# Patient Record
Sex: FEMALE | Race: WHITE | NOT HISPANIC OR LATINO | URBAN - METROPOLITAN AREA
[De-identification: names, ages, dates, MRNs, and addresses within clinical notes are randomized per-mention and may not be internally consistent; named-entity substitution may affect disease eponyms.]

---

## 2023-12-12 ENCOUNTER — APPOINTMENT (OUTPATIENT)
Dept: CARDIOLOGY | Facility: HOSPITAL | Age: 63
End: 2023-12-12
Payer: COMMERCIAL

## 2023-12-12 ENCOUNTER — HOSPITAL ENCOUNTER (EMERGENCY)
Facility: HOSPITAL | Age: 63
Discharge: HOME | End: 2023-12-12
Attending: STUDENT IN AN ORGANIZED HEALTH CARE EDUCATION/TRAINING PROGRAM
Payer: COMMERCIAL

## 2023-12-12 ENCOUNTER — APPOINTMENT (OUTPATIENT)
Dept: RADIOLOGY | Facility: HOSPITAL | Age: 63
End: 2023-12-12
Payer: COMMERCIAL

## 2023-12-12 VITALS
WEIGHT: 150 LBS | OXYGEN SATURATION: 100 % | HEART RATE: 66 BPM | SYSTOLIC BLOOD PRESSURE: 145 MMHG | BODY MASS INDEX: 24.11 KG/M2 | RESPIRATION RATE: 21 BRPM | TEMPERATURE: 98.8 F | DIASTOLIC BLOOD PRESSURE: 81 MMHG | HEIGHT: 66 IN

## 2023-12-12 DIAGNOSIS — R07.9 CHEST PAIN, UNSPECIFIED TYPE: Primary | ICD-10-CM

## 2023-12-12 LAB
ALBUMIN SERPL BCP-MCNC: 4.3 G/DL (ref 3.4–5)
ALP SERPL-CCNC: 73 U/L (ref 33–136)
ALT SERPL W P-5'-P-CCNC: 16 U/L (ref 7–45)
ANION GAP SERPL CALC-SCNC: 13 MMOL/L (ref 10–20)
AST SERPL W P-5'-P-CCNC: 19 U/L (ref 9–39)
BASOPHILS # BLD AUTO: 0.02 X10*3/UL (ref 0–0.1)
BASOPHILS NFR BLD AUTO: 0.3 %
BILIRUB SERPL-MCNC: 0.6 MG/DL (ref 0–1.2)
BUN SERPL-MCNC: 7 MG/DL (ref 6–23)
CALCIUM SERPL-MCNC: 9.3 MG/DL (ref 8.6–10.3)
CARDIAC TROPONIN I PNL SERPL HS: 8 NG/L (ref 0–13)
CARDIAC TROPONIN I PNL SERPL HS: <3 NG/L (ref 0–13)
CHLORIDE SERPL-SCNC: 101 MMOL/L (ref 98–107)
CO2 SERPL-SCNC: 26 MMOL/L (ref 21–32)
CREAT SERPL-MCNC: 0.6 MG/DL (ref 0.5–1.05)
EOSINOPHIL # BLD AUTO: 0.04 X10*3/UL (ref 0–0.7)
EOSINOPHIL NFR BLD AUTO: 0.5 %
ERYTHROCYTE [DISTWIDTH] IN BLOOD BY AUTOMATED COUNT: 12.7 % (ref 11.5–14.5)
GFR SERPL CREATININE-BSD FRML MDRD: >90 ML/MIN/1.73M*2
GLUCOSE SERPL-MCNC: 89 MG/DL (ref 74–99)
HCT VFR BLD AUTO: 42.7 % (ref 36–46)
HGB BLD-MCNC: 14.1 G/DL (ref 12–16)
IMM GRANULOCYTES # BLD AUTO: 0.02 X10*3/UL (ref 0–0.7)
IMM GRANULOCYTES NFR BLD AUTO: 0.3 % (ref 0–0.9)
LYMPHOCYTES # BLD AUTO: 0.72 X10*3/UL (ref 1.2–4.8)
LYMPHOCYTES NFR BLD AUTO: 9.1 %
MAGNESIUM SERPL-MCNC: 2.06 MG/DL (ref 1.6–2.4)
MCH RBC QN AUTO: 32.2 PG (ref 26–34)
MCHC RBC AUTO-ENTMCNC: 33 G/DL (ref 32–36)
MCV RBC AUTO: 98 FL (ref 80–100)
MONOCYTES # BLD AUTO: 0.8 X10*3/UL (ref 0.1–1)
MONOCYTES NFR BLD AUTO: 10.1 %
NEUTROPHILS # BLD AUTO: 6.29 X10*3/UL (ref 1.2–7.7)
NEUTROPHILS NFR BLD AUTO: 79.7 %
NRBC BLD-RTO: 0 /100 WBCS (ref 0–0)
PLATELET # BLD AUTO: 271 X10*3/UL (ref 150–450)
POTASSIUM SERPL-SCNC: 4.9 MMOL/L (ref 3.5–5.3)
PROT SERPL-MCNC: 6.5 G/DL (ref 6.4–8.2)
RBC # BLD AUTO: 4.38 X10*6/UL (ref 4–5.2)
SODIUM SERPL-SCNC: 135 MMOL/L (ref 136–145)
WBC # BLD AUTO: 7.9 X10*3/UL (ref 4.4–11.3)

## 2023-12-12 PROCEDURE — 84484 ASSAY OF TROPONIN QUANT: CPT | Performed by: EMERGENCY MEDICINE

## 2023-12-12 PROCEDURE — 71046 X-RAY EXAM CHEST 2 VIEWS: CPT | Mod: FOREIGN READ | Performed by: RADIOLOGY

## 2023-12-12 PROCEDURE — 93005 ELECTROCARDIOGRAM TRACING: CPT

## 2023-12-12 PROCEDURE — 2500000001 HC RX 250 WO HCPCS SELF ADMINISTERED DRUGS (ALT 637 FOR MEDICARE OP): Performed by: EMERGENCY MEDICINE

## 2023-12-12 PROCEDURE — 36415 COLL VENOUS BLD VENIPUNCTURE: CPT | Performed by: EMERGENCY MEDICINE

## 2023-12-12 PROCEDURE — 83735 ASSAY OF MAGNESIUM: CPT | Performed by: EMERGENCY MEDICINE

## 2023-12-12 PROCEDURE — 99283 EMERGENCY DEPT VISIT LOW MDM: CPT | Mod: 25

## 2023-12-12 PROCEDURE — 80053 COMPREHEN METABOLIC PANEL: CPT | Performed by: EMERGENCY MEDICINE

## 2023-12-12 PROCEDURE — 85025 COMPLETE CBC W/AUTO DIFF WBC: CPT | Performed by: EMERGENCY MEDICINE

## 2023-12-12 PROCEDURE — 99284 EMERGENCY DEPT VISIT MOD MDM: CPT | Performed by: STUDENT IN AN ORGANIZED HEALTH CARE EDUCATION/TRAINING PROGRAM

## 2023-12-12 PROCEDURE — 71046 X-RAY EXAM CHEST 2 VIEWS: CPT | Mod: FR

## 2023-12-12 RX ORDER — LOPERAMIDE HYDROCHLORIDE 2 MG/1
2 CAPSULE ORAL DAILY
COMMUNITY
Start: 2023-10-16

## 2023-12-12 RX ORDER — ACETAMINOPHEN 500 MG
4000 TABLET ORAL DAILY
COMMUNITY

## 2023-12-12 RX ORDER — NAPROXEN SODIUM 220 MG/1
324 TABLET, FILM COATED ORAL ONCE
Status: COMPLETED | OUTPATIENT
Start: 2023-12-12 | End: 2023-12-12

## 2023-12-12 RX ORDER — DULOXETIN HYDROCHLORIDE 30 MG/1
1 CAPSULE, DELAYED RELEASE ORAL DAILY
COMMUNITY
Start: 2023-08-04 | End: 2024-05-06 | Stop reason: ALTCHOICE

## 2023-12-12 RX ORDER — METOPROLOL TARTRATE 25 MG/1
25 TABLET, FILM COATED ORAL DAILY
COMMUNITY
Start: 2022-06-07 | End: 2024-01-19 | Stop reason: SDUPTHER

## 2023-12-12 RX ADMIN — ASPIRIN 81 MG CHEWABLE TABLET 324 MG: 81 TABLET CHEWABLE at 11:22

## 2023-12-12 ASSESSMENT — COLUMBIA-SUICIDE SEVERITY RATING SCALE - C-SSRS
2. HAVE YOU ACTUALLY HAD ANY THOUGHTS OF KILLING YOURSELF?: NO
6. HAVE YOU EVER DONE ANYTHING, STARTED TO DO ANYTHING, OR PREPARED TO DO ANYTHING TO END YOUR LIFE?: NO
1. IN THE PAST MONTH, HAVE YOU WISHED YOU WERE DEAD OR WISHED YOU COULD GO TO SLEEP AND NOT WAKE UP?: NO

## 2023-12-12 ASSESSMENT — PAIN - FUNCTIONAL ASSESSMENT: PAIN_FUNCTIONAL_ASSESSMENT: 0-10

## 2023-12-12 ASSESSMENT — PAIN SCALES - GENERAL: PAINLEVEL_OUTOF10: 0 - NO PAIN

## 2023-12-12 NOTE — PROGRESS NOTES
Pharmacy Medication History Review    Vida Garcia is a 63 y.o. female admitted for No Principal Problem: There is no principal problem currently on the Problem List. Please update the Problem List and refresh.. Pharmacy reviewed the patient's [id number] medications and allergies for accuracy.    The list below reflectives the updated PTA list. Please review each medication in order reconciliation for additional clarification and justification.  (Not in a hospital admission)       The list below reflectives the updated allergy list. Please review each documented allergy for additional clarification and justification.  Allergies  Reviewed by Bruce Bourne MD on 12/12/2023        Severity Reactions Comments    Diphenoxylate-atropine Low Nausea/vomiting     Meperidine Low Nausea/vomiting     Metronidazole Low Nausea/vomiting     Sulfa (sulfonamide Antibiotics) Low Swelling             Below are additional concerns with the patient's PTA list.    See PTA med list    Linda Van CPhT

## 2023-12-12 NOTE — ED TRIAGE NOTES
PT. STATES GOT CHEST PAIN AFTER WAKING AROUND 0810 THIS MORNING, STATES PAIN RADIATED ACROSS CHEST. PT. STATES PAIN ONLY LASTED ABOUT 10 SECONDS, DENIES PAIN IN TRIAGE. PT. STATES FELT FLUSHED RIGHT AFTER, DENIES OTHER ASSOCIATED SYMPTOMS. PT. STATES ONLY HAS ONE LUNG S/P LUNG CA.

## 2023-12-12 NOTE — ED PROVIDER NOTES
"HPI   Chief Complaint   Patient presents with    Chest Pain     PT. STATES GOT CHEST PAIN AFTER WAKING AROUND 0810 THIS MORNING, STATES PAIN RADIATED ACROSS CHEST. PT. STATES PAIN ONLY LASTED ABOUT 10 SECONDS, DENIES PAIN IN TRIAGE. PT. STATES FELT FLUSHED RIGHT AFTER, DENIES OTHER ASSOCIATED SYMPTOMS. PT. STATES ONLY HAS ONE LUNG S/P LUNG CA.       Patient is a pleasant 63-year-old female, medical history significant for prior lung cancer status post resection, history of SVT, the presents to the emergency department chest pain.  Patient states that last night, she bent her head down and felt some tightness in her upper chest.  She states it was fleeting.  Today, she was getting out of bed and had a sharp pain in the substernal area that lasted about 7 to 10 seconds.  She states that she did get a little bit sweaty afterwards.  Pain has not recurred.  She states \"today, she has been in her normal state of health.  She denies fever.  She denies chills or sweats.  She denies any other systemic complaints.      History provided by:  Patient                      Katharine Coma Scale Score: 15                  Patient History   Past Medical History:   Diagnosis Date    Cancer (CMS/HCC)     SVT (supraventricular tachycardia)      Past Surgical History:   Procedure Laterality Date    LUNG REMOVAL, TOTAL      OTHER SURGICAL HISTORY  01/03/2022    Pneumonectomy     No family history on file.  Social History     Tobacco Use    Smoking status: Former     Types: Cigarettes    Smokeless tobacco: Never   Vaping Use    Vaping Use: Never used   Substance Use Topics    Alcohol use: Yes     Comment: 2 BEERS DAILY    Drug use: Never       Physical Exam   ED Triage Vitals [12/12/23 0923]   Temp Heart Rate Resp BP   37.1 °C (98.8 °F) 73 16 128/73      SpO2 Temp Source Heart Rate Source Patient Position   97 % Temporal Monitor Sitting      BP Location FiO2 (%)     Right arm --       Physical Exam  Vitals and nursing note reviewed. "   Constitutional:       General: She is not in acute distress.     Appearance: She is well-developed.   HENT:      Head: Normocephalic and atraumatic.   Eyes:      Conjunctiva/sclera: Conjunctivae normal.   Neck:      Thyroid: No thyromegaly.   Cardiovascular:      Rate and Rhythm: Normal rate and regular rhythm.      Heart sounds: No murmur heard.  Pulmonary:      Effort: Pulmonary effort is normal. No respiratory distress.      Breath sounds: Normal breath sounds.   Abdominal:      Palpations: Abdomen is soft.      Tenderness: There is no abdominal tenderness.   Musculoskeletal:         General: No swelling.      Cervical back: Normal range of motion and neck supple.   Skin:     General: Skin is warm and dry.      Capillary Refill: Capillary refill takes less than 2 seconds.   Neurological:      General: No focal deficit present.      Mental Status: She is alert and oriented to person, place, and time.   Psychiatric:         Mood and Affect: Mood normal.         ED Course & [facility]   ED Course as of 12/12/23 1329   Tue Dec 12, 2023   1122 EKG interpreted by me.  Sinus rhythm.  Rate of 79.  Flattening the T waves laterally.  No acute ischemia.  No STEMI.  No significant change from prior. [MK]   1123 Chest x-ray demonstrates evidence of prior pneumonectomy, no infiltrate, no effusion. [MK]   1123 CBC unremarkable. [MK]   1207 Metabolic panel unremarkable.  Initial cardiac enzymes normal. [MK]   1322 Repeat troponin normal. [MK]      ED Course User Index  [MK] Giovanni Patel MD         Diagnoses as of 12/12/23 1329   Chest pain, unspecified type       Medical Decision Making  Medical Decision Making: Patient presents to the emergency department transient chest pain that is since resolved.  On arrival, she had no further symptoms.  Metabolic workup was pursued.  EKG does not show evidence of acute ischemia.  First cardiac enzymes are normal.  Metabolic workup also unremarkable.  Patient reevaluated has remained  pain-free.  Repeat cardiac enzymes were also unremarkable.  I did have discussion with patient.  Based on her heart score of 3, I do feel that she is safe for outpatient therapy.  The patient is agreeable.    EKG interpreted by myself (ED attending physician): Reviewed    Differential Diagnoses Considered: Acute coronary syndrome, pneumonia, musculoskeletal pain    Chronic Medical Conditions Significantly Affecting Care: History of prior lung cancer, history of SVT    External Records Reviewed: I reviewed recent and relevant outside records including: Outpatient medication reconciliation    Independent Interpretation of Studies:  I independently interpreted: Chest x-ray obtained and reviewed    Escalation of Care:  Appropriate for outpatient management    Social Determinants of Health Significantly Affecting Care:  No social determinants    Prescription Drug Consideration: Aspirin    Diagnostic testing considered: Noncontributory          Procedure  Procedures     Giovanni Patel MD  12/12/23 [id number]

## 2023-12-14 LAB
ATRIAL RATE: 79 BPM
P AXIS: 95 DEGREES
PR INTERVAL: 159 MS
Q ONSET: 251 MS
QRS COUNT: 13 BEATS
QRS DURATION: 92 MS
QT INTERVAL: 355 MS
QTC CALCULATION(BAZETT): 407 MS
QTC FREDERICIA: 389 MS
R AXIS: 48 DEGREES
T AXIS: -44 DEGREES
T OFFSET: 428 MS
VENTRICULAR RATE: 79 BPM

## 2024-01-19 DIAGNOSIS — I10 HYPERTENSION, UNSPECIFIED TYPE: ICD-10-CM

## 2024-01-22 RX ORDER — METOPROLOL TARTRATE 25 MG/1
25 TABLET, FILM COATED ORAL DAILY
Qty: 180 TABLET | Refills: 1 | Status: SHIPPED | OUTPATIENT
Start: 2024-01-22

## 2024-04-04 ENCOUNTER — HOSPITAL ENCOUNTER (OUTPATIENT)
Dept: VASCULAR MEDICINE | Facility: CLINIC | Age: 64
Discharge: HOME | End: 2024-04-04
Payer: COMMERCIAL

## 2024-04-04 DIAGNOSIS — M79.601 PAIN IN RIGHT ARM: ICD-10-CM

## 2024-04-04 PROCEDURE — 93971 EXTREMITY STUDY: CPT

## 2024-04-04 PROCEDURE — 93971 EXTREMITY STUDY: CPT | Performed by: INTERNAL MEDICINE

## 2024-04-18 PROBLEM — Z86.19 HISTORY OF SHINGLES: Status: ACTIVE | Noted: 2024-04-01

## 2024-04-18 PROBLEM — I47.10 PAROXYSMAL SVT (SUPRAVENTRICULAR TACHYCARDIA) (CMS-HCC): Status: ACTIVE | Noted: 2022-05-27

## 2024-04-18 PROBLEM — K50.80 REGIONAL ENTERITIS OF SMALL INTESTINE WITH LARGE INTESTINE (MULTI): Status: ACTIVE | Noted: 2024-04-18

## 2024-04-18 PROBLEM — E78.5 HYPERLIPIDEMIA: Status: ACTIVE | Noted: 2024-04-01

## 2024-04-18 PROBLEM — F41.1 GAD (GENERALIZED ANXIETY DISORDER): Status: ACTIVE | Noted: 2022-08-25

## 2024-04-18 PROBLEM — E55.9 VITAMIN D DEFICIENCY: Status: ACTIVE | Noted: 2022-03-30

## 2024-05-06 ENCOUNTER — OFFICE VISIT (OUTPATIENT)
Dept: CARDIOLOGY | Facility: CLINIC | Age: 64
End: 2024-05-06
Payer: COMMERCIAL

## 2024-05-06 VITALS
OXYGEN SATURATION: 96 % | SYSTOLIC BLOOD PRESSURE: 118 MMHG | WEIGHT: 157 LBS | DIASTOLIC BLOOD PRESSURE: 74 MMHG | BODY MASS INDEX: 25.23 KG/M2 | HEIGHT: 66 IN | HEART RATE: 76 BPM

## 2024-05-06 DIAGNOSIS — E78.2 MIXED HYPERLIPIDEMIA: Primary | ICD-10-CM

## 2024-05-06 DIAGNOSIS — I47.10 PAROXYSMAL SVT (SUPRAVENTRICULAR TACHYCARDIA) (CMS-HCC): ICD-10-CM

## 2024-05-06 PROCEDURE — 99213 OFFICE O/P EST LOW 20 MIN: CPT | Performed by: INTERNAL MEDICINE

## 2024-05-06 PROCEDURE — 1036F TOBACCO NON-USER: CPT | Performed by: INTERNAL MEDICINE

## 2024-05-06 RX ORDER — PRAVASTATIN SODIUM 40 MG/1
40 TABLET ORAL
COMMUNITY
Start: 2024-04-01 | End: 2025-04-01

## 2024-05-06 NOTE — ASSESSMENT & PLAN NOTE
1.  PSVT: This patient has a history of SVT up to 210 bpm which was first identified in December 2021.  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  She has had no documented recurrences since then.  The patient will use the metoprolol as needed for palpitations.  No further cardiac testing is recommended at this time.  Follow-up in 12 to 18 months.

## 2024-05-06 NOTE — PROGRESS NOTES
"History Of Present Illness:      This is a 63-year-old female with a history of supraventricular tachycardia.  She had a recent emergency department visit because of atypical chest pain.  She was recently placed on pravastatin for treatment of hyperlipidemia as the most recent LDL was 161 mg/dL.    Past medical history:    Lung cancer with history of left pneumonectomy  COPD  Crohn's disease with history of a colectomy  PSVT: History of catheter ablation May 2022 for typical AVNRT    Review of Systems  Other review of systems negative     Last Recorded Vitals:      5/27/2023     9:39 AM 5/27/2023     1:03 PM 6/21/2023    11:35 AM 12/12/2023     9:23 AM 12/12/2023    11:30 AM 12/12/2023     1:30 PM 5/6/2024    11:16 AM   Vitals   Systolic 138  110 128 141 145 118   Diastolic 77  68 73 72 81 74   Heart Rate 77  74 73 67 66 76   Temp 36.4 °C (97.5 °F)   37.1 °C (98.8 °F)      Resp 16   16 21 21    Height (in) 1.676 m (5' 5.98\") 1.701 m (5' 6.97\") 1.702 m (5' 7\") 1.676 m (5' 6\")   1.676 m (5' 6\")   Weight (lb) 143.3 143.3 149 150   157   BMI 23.14 kg/m2 22.46 kg/m2 23.34 kg/m2 24.21 kg/m2   25.34 kg/m2   BSA (m2) 1.74 m2 1.75 m2 1.79 m2 1.78 m2   1.82 m2   Visit Report       Report          Allergies:  Diphenoxylate-atropine, Meperidine, Metronidazole, and Sulfa (sulfonamide antibiotics)    Outpatient Medications:  Current Outpatient Medications   Medication Instructions    cholecalciferol (VITAMIN D-3) 4,000 Units, oral, Daily    loperamide (IMODIUM) 2 mg, oral, Daily    metoprolol tartrate (LOPRESSOR) 25 mg, oral, Daily    pravastatin (PRAVACHOL) 40 mg, oral, Daily RT       Physical Exam:    General Appearance:  Alert, oriented, no distress  Skin:  Warm and dry  Head and Neck:  No elevation of JVP, no carotid bruits  Cardiac Exam:  Rhythm is regular, S1 and S2 are normal, grade 1/6 systolic murmur at the right upper sternal border0  Lungs:  Clear to auscultation  Extremities:  no edema  Neurologic:  No focal " deficits  Psychiatric:  Appropriate mood and behavior         Cardiology Tests:  I have personally review the diagnostic cardiac testing and my interpretation is as follows:    Cardiac event monitor January 2022: Normal sinus rhythm with an average heart rate of 72 bpm.  No episodes of SVT.          Assessment/Plan   Problem List Items Addressed This Visit             ICD-10-CM    Hyperlipidemia - Primary E78.5    Relevant Orders    CT cardiac scoring wo IV contrast    Paroxysmal SVT (supraventricular tachycardia) (CMS-HCC) I47.10     1.  PSVT: This patient has a history of SVT up to 210 bpm which was first identified in December 2021.  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  She has had no documented recurrences since then.  The patient will use the metoprolol as needed for palpitations.  No further cardiac testing is recommended at this time.  Follow-up in 12 to 18 months.            James C Ramicone, DO

## 2024-06-27 ENCOUNTER — HOSPITAL ENCOUNTER (OUTPATIENT)
Dept: RADIOLOGY | Facility: CLINIC | Age: 64
Discharge: HOME | End: 2024-06-27
Payer: MEDICAID

## 2024-06-27 DIAGNOSIS — E78.2 MIXED HYPERLIPIDEMIA: ICD-10-CM

## 2024-06-27 PROCEDURE — 75571 CT HRT W/O DYE W/CA TEST: CPT

## 2024-08-06 ENCOUNTER — APPOINTMENT (OUTPATIENT)
Dept: UROLOGY | Facility: CLINIC | Age: 64
End: 2024-08-06
Payer: MEDICARE

## 2024-08-06 VITALS
HEIGHT: 66 IN | SYSTOLIC BLOOD PRESSURE: 120 MMHG | TEMPERATURE: 96.7 F | HEART RATE: 77 BPM | BODY MASS INDEX: 24.11 KG/M2 | DIASTOLIC BLOOD PRESSURE: 80 MMHG | WEIGHT: 150 LBS

## 2024-08-06 DIAGNOSIS — R39.198 DIFFICULTY VOIDING: Primary | ICD-10-CM

## 2024-08-06 DIAGNOSIS — R10.9 FLANK PAIN: ICD-10-CM

## 2024-08-06 DIAGNOSIS — R82.81 PYURIA: ICD-10-CM

## 2024-08-06 PROBLEM — K50.80 REGIONAL ENTERITIS OF SMALL INTESTINE WITH LARGE INTESTINE (MULTI): Status: RESOLVED | Noted: 2024-04-18 | Resolved: 2024-08-06

## 2024-08-06 PROBLEM — E78.5 HYPERLIPIDEMIA: Status: RESOLVED | Noted: 2024-04-01 | Resolved: 2024-08-06

## 2024-08-06 PROBLEM — K50.80 CROHN'S DISEASE OF BOTH SMALL AND LARGE INTESTINE (MULTI): Status: RESOLVED | Noted: 2019-09-30 | Resolved: 2024-08-06

## 2024-08-06 PROBLEM — K50.80 CROHN'S DISEASE OF BOTH SMALL AND LARGE INTESTINE (MULTI): Status: ACTIVE | Noted: 2019-09-30

## 2024-08-06 LAB
POC APPEARANCE, URINE: CLEAR
POC BILIRUBIN, URINE: NEGATIVE
POC BLOOD, URINE: NEGATIVE
POC COLOR, URINE: YELLOW
POC GLUCOSE, URINE: NEGATIVE MG/DL
POC KETONES, URINE: NEGATIVE MG/DL
POC LEUKOCYTES, URINE: ABNORMAL
POC NITRITE,URINE: NEGATIVE
POC PH, URINE: 6.5 PH
POC PROTEIN, URINE: NEGATIVE MG/DL
POC SPECIFIC GRAVITY, URINE: 1.01
POC UROBILINOGEN, URINE: 0.2 EU/DL

## 2024-08-06 PROCEDURE — 99204 OFFICE O/P NEW MOD 45 MIN: CPT | Performed by: NURSE PRACTITIONER

## 2024-08-06 PROCEDURE — 81003 URINALYSIS AUTO W/O SCOPE: CPT | Performed by: NURSE PRACTITIONER

## 2024-08-06 PROCEDURE — 3008F BODY MASS INDEX DOCD: CPT | Performed by: NURSE PRACTITIONER

## 2024-08-06 PROCEDURE — 1036F TOBACCO NON-USER: CPT | Performed by: NURSE PRACTITIONER

## 2024-08-06 RX ORDER — DULOXETIN HYDROCHLORIDE 30 MG/1
CAPSULE, DELAYED RELEASE ORAL
COMMUNITY
Start: 2023-08-04

## 2024-08-06 NOTE — PROGRESS NOTES
UROLOGIC INITIAL EVALUATION     PROBLEM LIST:  1. Difficulty voiding  CT abdomen pelvis wo IV contrast    Urine Culture      2. Flank pain  POCT UA Automated manually resulted      3. Pyuria  Urine Culture    Microscopic Only, Urine           HISTORY OF PRESENT ILLNESS:   Vida Garcia is a 63 y.o. with lung, cancer BAILEY  Presents today with concern for possible kidney stone, UTI  Seen unaccompanied  Reports significant B mid-back pain 3 weeks ago  Afterwards had to strain to void  Thought she might have kidney stone  Went to urgent care, prescribed nitrofurantoin, oxybutynin & tamsulosin  Notes stomach aches, explosive diarrhea since starting abx  Hx bowel resection; last normal BM 2 weeks ago  Normally ranges from firm to loose   Was feeling better then had nausea yesterday  Finished nitrofurantoin & stopped tamsulosin Sunday   No hx of recurrent UTI or nephrolithiasis  No hematuria or dysuria    PAST MEDICAL HISTORY:  Past Medical History:   Diagnosis Date    Cancer (Multi)     Squamous cell carcinoma of lung (Multi) 06/17/2010    Note: 10/1/2010 Assessment: The patient is a 49-year-old woman with a left hilar  mass.  She has been treated with induction chemoradiation therapy  with good response.  Her metastatic survey is negative. S/p bronchoscopy, left anterior thoracotomy, left intrapericaridal pneumonectomy, radical lymphadenectomy. Transferred to [facility] on 9/29/2010.      SVT (supraventricular tachycardia) (CMS-HCC)        PAST SURGICAL HISTORY:  Past Surgical History:   Procedure Laterality Date    LUNG REMOVAL, TOTAL      OTHER SURGICAL HISTORY  01/03/2022    Pneumonectomy        ALLERGIES:   Allergies   Allergen Reactions    Diphenoxylate-Atropine Nausea/vomiting    Meperidine Nausea/vomiting    Metronidazole Nausea/vomiting    Sulfa (Sulfonamide Antibiotics) Swelling        MEDICATIONS:   Current Outpatient Medications on File Prior to Visit   Medication Sig Dispense Refill    DULoxetine (Cymbalta) 30 mg   capsule Take by mouth.      metoprolol tartrate (Lopressor) 25 mg tablet Take 1 tablet (25 mg) by mouth once daily. 180 tablet 1    pravastatin (Pravachol) 40 mg tablet Take 1 tablet (40 mg) by mouth once daily.      cholecalciferol (Vitamin D-3) 50 mcg (2,000 unit) capsule Take 2 capsules (100 mcg) by mouth early in the morning..      loperamide (Imodium) 2 mg capsule Take 1 capsule (2 mg) by mouth once daily.       No current facility-administered medications on file prior to visit.        SOCIAL HISTORY:  Patient  reports that she has quit smoking. Her smoking use included cigarettes. She has never used smokeless tobacco. She reports current alcohol use. She reports that she does not use drugs.   Social History     Socioeconomic History    Marital status: Single     Spouse name: Not on file    Number of children: Not on file    Years of education: Not on file    Highest education level: Not on file   Occupational History    Not on file   Tobacco Use    Smoking status: Former     Types: Cigarettes    Smokeless tobacco: Never   Vaping Use    Vaping status: Never Used   Substance and Sexual Activity    Alcohol use: Yes     Comment: 2 BEERS DAILY    Drug use: Never    Sexual activity: Not on file   Other Topics Concern    Not on file   Social History Narrative    Not on file     Social Determinants of Health     Financial Resource Strain: Patient Declined (8/7/2023)    Received from [employer]    Overall Financial Resource Strain (CARDIA)     Difficulty of Paying Living Expenses: Patient declined   Food Insecurity: Patient Declined (8/7/2023)    Received from [employer]    Hunger Vital Sign     Worried About Running Out of Food in the Last Year: Patient declined     Ran Out of Food in the Last Year: Patient declined   Transportation Needs: Patient Declined (8/7/2023)    Received from [employer]    PRAPARE - Transportation     Lack of Transportation (Medical): Patient declined      Lack of Transportation (Non-Medical): Patient declined   Physical Activity: Patient Declined (8/7/2023)    Received from [email or web address]    Exercise Vital Sign     Days of Exercise per Week: Patient declined     Minutes of Exercise per Session: Patient declined   Stress: Patient Declined (8/7/2023)    Received from [email or web address]    [employer] - Occupational Stress Questionnaire     Feeling of Stress : Patient declined   Social Connections: Patient Declined (8/7/2023)    Received from [email or web address]    Social Connection and Isolation Panel [NHANES]     Frequency of Communication with Friends and Family: Patient declined     Frequency of Social Gatherings with Friends and Family: Patient declined     Attends Yazidi Services: Patient declined     Active Member of Clubs or Organizations: Patient declined     Attends Club or Organization Meetings: Patient declined     Marital Status: Patient declined   Intimate Partner Violence: Patient Declined (8/7/2023)    Received from [email or web address]    Humiliation, Afraid, Rape, and Kick questionnaire     Fear of Current or Ex-Partner: Patient declined     Emotionally Abused: Patient declined     Physically Abused: Patient declined     Sexually Abused: Patient declined   Housing Stability: Unknown (8/7/2023)    Received from [email or web address]    Housing Stability Vital Sign     Unable to Pay for Housing in the Last Year: Patient refused     Number of Places Lived in the Last Year: Not on file     Unstable Housing in the Last Year: Patient refused       FAMILY HISTORY:  No family history on file.    REVIEW OF SYSTEMS:  All systems reviewed, pertinent negatives as noted in HPI.     PHYSICAL EXAM:  Visit Vitals  /80   Pulse 77   Temp 35.9 °C (96.7 °F) (Temporal)     Constitutional: Well-developed and well-nourished. No distress.    Head: Normocephalic and atraumatic.    Neck: Normal range of  motion.     Pulmonary/Chest: Effort normal. No respiratory distress.   Abdominal: Non-distended.  : See below.  Integumentary: No rash or lesions visualized.  Musculoskeletal: Normal range of motion.    Neurological: Alert and oriented.  Psychiatric: Normal mood and affect. Thought content normal.      LABORATORY REVIEW:   Lab Results   Component Value Date    BUN 7 12/12/2023    CREATININE 0.60 12/12/2023    EGFR >90 12/12/2023     (L) 12/12/2023    K 4.9 12/12/2023     12/12/2023    CO2 26 12/12/2023    CALCIUM 9.3 12/12/2023      Lab Results   Component Value Date    WBC 7.9 12/12/2023    RBC 4.38 12/12/2023    HGB 14.1 12/12/2023    HCT 42.7 12/12/2023    MCV 98 12/12/2023    MCH 32.2 12/12/2023    MCHC 33.0 12/12/2023    RDW 12.7 12/12/2023     12/12/2023        Urine dipstick shows positive for leukocytes.        Assessment:      1. Difficulty voiding  CT abdomen pelvis wo IV contrast    Urine Culture      2. Flank pain  POCT UA Automated manually resulted      3. Pyuria  Urine Culture    Microscopic Only, Urine          Vida Garcia is a 63 y.o. with recent back pain, difficulty urinating; UA at urgent care + nitrites, leuks  Treated for UTI, started on oxybutynin & tamsulosin  Back pain resolved, now with abdominal pain  No recent imaging on file  UA today + leuks; resolving vs persistent infection     Plan:   Urine for micro, culture  Resume tamsulosin 0.4 mg po daily  CT A/P to r/o kidney stones  RTC pending above results  Encouraged to contact us in the interim with any questions, concerns

## 2024-08-07 ENCOUNTER — APPOINTMENT (OUTPATIENT)
Dept: RADIOLOGY | Facility: CLINIC | Age: 64
End: 2024-08-07
Payer: MEDICARE

## 2024-08-07 ENCOUNTER — HOSPITAL ENCOUNTER (OUTPATIENT)
Dept: RADIOLOGY | Facility: CLINIC | Age: 64
End: 2024-08-07
Payer: MEDICARE

## 2024-08-08 ENCOUNTER — HOSPITAL ENCOUNTER (OUTPATIENT)
Dept: RADIOLOGY | Facility: CLINIC | Age: 64
Discharge: HOME | End: 2024-08-08
Payer: MEDICARE

## 2024-08-08 DIAGNOSIS — R39.198 DIFFICULTY VOIDING: ICD-10-CM

## 2024-08-08 PROCEDURE — 74176 CT ABD & PELVIS W/O CONTRAST: CPT | Performed by: RADIOLOGY

## 2024-08-08 PROCEDURE — 74176 CT ABD & PELVIS W/O CONTRAST: CPT

## 2024-10-21 ENCOUNTER — HOSPITAL ENCOUNTER (OUTPATIENT)
Dept: RADIOLOGY | Facility: CLINIC | Age: 64
Discharge: HOME | End: 2024-10-21
Payer: MEDICARE

## 2024-10-21 DIAGNOSIS — Z79.52 LONG TERM (CURRENT) USE OF SYSTEMIC STEROIDS: ICD-10-CM

## 2024-10-21 DIAGNOSIS — M84.375A STRESS FRACTURE, LEFT FOOT, INITIAL ENCOUNTER FOR FRACTURE: ICD-10-CM

## 2024-10-21 PROCEDURE — 77080 DXA BONE DENSITY AXIAL: CPT

## 2024-10-21 PROCEDURE — 77080 DXA BONE DENSITY AXIAL: CPT | Performed by: RADIOLOGY

## 2024-10-23 ENCOUNTER — LAB (OUTPATIENT)
Dept: LAB | Facility: LAB | Age: 64
End: 2024-10-23
Payer: MEDICARE

## 2024-10-23 DIAGNOSIS — M79.672 PAIN IN LEFT FOOT: Primary | ICD-10-CM

## 2024-10-23 LAB — 25(OH)D3 SERPL-MCNC: 33 NG/ML (ref 30–100)

## 2024-10-23 PROCEDURE — 82306 VITAMIN D 25 HYDROXY: CPT

## 2024-12-31 DIAGNOSIS — I10 HYPERTENSION, UNSPECIFIED TYPE: ICD-10-CM

## 2024-12-31 RX ORDER — PRAVASTATIN SODIUM 40 MG/1
40 TABLET ORAL
Qty: 90 TABLET | Refills: 3 | Status: SHIPPED | OUTPATIENT
Start: 2024-12-31 | End: 2025-12-31

## 2024-12-31 RX ORDER — METOPROLOL TARTRATE 25 MG/1
25 TABLET, FILM COATED ORAL DAILY
Qty: 180 TABLET | Refills: 3 | Status: SHIPPED | OUTPATIENT
Start: 2024-12-31

## 2025-05-08 ENCOUNTER — TELEPHONE (OUTPATIENT)
Dept: CARDIOLOGY | Facility: CLINIC | Age: 65
End: 2025-05-08
Payer: MEDICARE

## 2025-05-08 NOTE — TELEPHONE ENCOUNTER
Pt called today. She states that she has been taking metoprolol for ~1 yr. Recent lab work has shown elevated K and low Na levels and is wondering if metoprolol can affect these levels. K has been 5.3-5.6 and Na 131-134. She is asking if she can hold for 1 month and get labs rechecked. Pt states that PCP is concerned.    Discussed with Dr Ramicone, he is ok with her holding metoprolol but he doesn't think that this is causing her abnormal labs.

## 2025-05-19 PROBLEM — F17.218 NICOTINE DEPENDENCE, CIGARETTES, WITH OTHER NICOTINE-INDUCED DISORDERS: Status: ACTIVE | Noted: 2024-10-18

## 2025-05-19 PROBLEM — M81.0 OSTEOPOROSIS: Status: ACTIVE | Noted: 2024-12-17

## 2025-05-19 PROBLEM — M84.375A STRESS FRACTURE OF LEFT FOOT: Status: ACTIVE | Noted: 2024-10-29

## 2025-05-19 PROBLEM — I73.9 SMALL VESSEL DISEASE: Status: ACTIVE | Noted: 2024-11-07

## 2025-05-19 PROBLEM — F10.90 HEAVY ALCOHOL USE: Status: ACTIVE | Noted: 2025-04-28

## 2025-05-19 PROBLEM — Z86.19 HISTORY OF SHINGLES: Status: RESOLVED | Noted: 2024-04-01 | Resolved: 2025-05-19

## 2025-06-08 NOTE — PROGRESS NOTES
"    History Of Present Illness:      This is a 64-year-old female with a history of supraventricular tachycardia.  She has been experiencing headaches and has increased her dietary sodium per endocrine recommendations which has helped her headaches.  She is not watching her blood pressure more closely at home.  From an EP perspective, no recurrences of SVT and she is doing well on metoprolol tartrate 25 mg once daily.     Past medical history:     Lung cancer with history of left pneumonectomy  COPD  Crohn's disease with history of a colectomy  PSVT: History of catheter ablation May 2022 for typical AVNRT       Review of Systems  Other review of systems negative  Last Recorded Vitals:      5/27/2023     1:03 PM 6/21/2023    11:35 AM 12/12/2023     9:23 AM 12/12/2023    11:30 AM 12/12/2023     1:30 PM 5/6/2024    11:16 AM 8/6/2024     1:10 PM   Vitals   Systolic  110 128 141 145 118 120   Diastolic  68 73 72 81 74 80   BP Location   Right arm   Right arm    Heart Rate  74 73 67 66 76 77   Temp   37.1 °C (98.8 °F)    35.9 °C (96.7 °F)   Resp   16 21 21     Height 1.701 m (5' 6.97\") 1.702 m (5' 7\") 1.676 m (5' 6\")   1.676 m (5' 6\") 1.676 m (5' 6\")   Weight (lb) 143.3 149 150   157 150   BMI 22.46 kg/m2 23.34 kg/m2 24.21 kg/m2   25.34 kg/m2 24.21 kg/m2   BSA (m2) 1.75 m2 1.79 m2 1.78 m2   1.82 m2 1.78 m2   Visit Report      Report Report     Allergies:  Diphenoxylate-atropine, Meperidine, Metronidazole, and Sulfa (sulfonamide antibiotics)  Outpatient Medications:  Current Outpatient Medications   Medication Instructions    cholecalciferol (VITAMIN D-3) 4,000 Units, oral, Daily    DULoxetine (Cymbalta) 30 mg DR capsule oral    loperamide (IMODIUM) 2 mg, oral, Daily    metoprolol tartrate (LOPRESSOR) 25 mg, oral, Daily    pravastatin (PRAVACHOL) 40 mg, oral, Daily RT       Physical Exam:    General Appearance:  Alert, oriented, no distress  Skin:  Warm and dry  Head and Neck:  No elevation of JVP, no carotid " bruits  Cardiac Exam:  Rhythm is regular, S1 and S2 are normal, no murmur S3 or S4  Lungs: Diminished over left lung field  Extremities:  no edema  Neurologic:  No focal deficits  Psychiatric:  Appropriate mood and behavior    Lab Results:    CMP:  Recent Labs     12/12/23  1106 05/28/23  0551 05/27/23  0949 05/19/22  0704 12/30/21  1114   * 135* 134* 137 137   K 4.9 4.2 4.3 4.1 4.3    104 98 102 100   CO2 26 24 29 26 26   ANIONGAP 13 11 11 13 15   BUN 7 8 8 7 7   CREATININE 0.60 0.54 0.68 0.67 0.63   EGFR >90  --   --   --   --    MG 2.06  --  2.01  --   --      Recent Labs     12/12/23  1106 05/27/23  0949 12/30/21  1114   ALBUMIN 4.3 4.8 4.6   ALKPHOS 73 72 71   ALT 16 17 19   AST 19 20 22   BILITOT 0.6 0.6 0.6     CBC:  Recent Labs     12/12/23  1106 05/27/23  0949 05/19/22  0704 12/30/21  1114   WBC 7.9 6.8 5.5 6.8   HGB 14.1 15.4 16.5* 15.9   HCT 42.7 46.7* 49.9* 47.7*    317 308 279   MCV 98 94 98 98     COAG:   Recent Labs     05/19/22  0704 12/30/21  1114   INR 1.0 1.1     Cardiology Tests (personally reviewed):    I have personally review the diagnostic cardiac testing and my interpretation is as follows:     Cardiac event monitor January 2022: Normal sinus rhythm with an average heart rate of 72 bpm.  No episodes of SVT.    Assessment/Plan   Problem List Items Addressed This Visit           ICD-10-CM    Paroxysmal SVT (supraventricular tachycardia) - Primary I47.10    Vida has a history of supraventricular tachycardia up to 210 bpm which was initially identified in December 2021.  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  At this time she will remain on metoprolol tartrate 25 mg once daily.  She has had no SVT recurrences.  The patient is going to monitor blood pressure readings more closely at home since she has recently increased her sodium intake because of hyponatremia.  My repeat blood pressure today in the office was 122/74.         Relevant Orders     ECG 12 lead (Clinic Performed) (Completed)       James C Ramicone, DO

## 2025-06-09 ENCOUNTER — APPOINTMENT (OUTPATIENT)
Dept: CARDIOLOGY | Facility: CLINIC | Age: 65
End: 2025-06-09
Payer: COMMERCIAL

## 2025-06-09 VITALS
BODY MASS INDEX: 25.39 KG/M2 | OXYGEN SATURATION: 96 % | DIASTOLIC BLOOD PRESSURE: 80 MMHG | SYSTOLIC BLOOD PRESSURE: 134 MMHG | WEIGHT: 158 LBS | HEART RATE: 83 BPM | HEIGHT: 66 IN

## 2025-06-09 DIAGNOSIS — I47.10 PAROXYSMAL SVT (SUPRAVENTRICULAR TACHYCARDIA): Primary | ICD-10-CM

## 2025-06-09 LAB
ATRIAL RATE: 73 BPM
P AXIS: 90 DEGREES
P OFFSET: 192 MS
P ONSET: 146 MS
PR INTERVAL: 166 MS
Q ONSET: 229 MS
QRS COUNT: 12 BEATS
QRS DURATION: 78 MS
QT INTERVAL: 376 MS
QTC CALCULATION(BAZETT): 414 MS
QTC FREDERICIA: 401 MS
R AXIS: 64 DEGREES
T AXIS: 24 DEGREES
T OFFSET: 417 MS
VENTRICULAR RATE: 73 BPM

## 2025-06-09 PROCEDURE — 93005 ELECTROCARDIOGRAM TRACING: CPT | Performed by: INTERNAL MEDICINE

## 2025-06-09 PROCEDURE — 99212 OFFICE O/P EST SF 10 MIN: CPT | Performed by: INTERNAL MEDICINE

## 2025-06-09 PROCEDURE — 99213 OFFICE O/P EST LOW 20 MIN: CPT | Performed by: INTERNAL MEDICINE

## 2025-06-09 PROCEDURE — 1036F TOBACCO NON-USER: CPT | Performed by: INTERNAL MEDICINE

## 2025-06-09 PROCEDURE — 3008F BODY MASS INDEX DOCD: CPT | Performed by: INTERNAL MEDICINE

## 2025-06-09 PROCEDURE — 99212 OFFICE O/P EST SF 10 MIN: CPT

## 2025-06-09 NOTE — ASSESSMENT & PLAN NOTE
Vida has a history of supraventricular tachycardia up to 210 bpm which was initially identified in December 2021.  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  At this time she will remain on metoprolol tartrate 25 mg once daily.  She has had no SVT recurrences.  The patient is going to monitor blood pressure readings more closely at home since she has recently increased her sodium intake because of hyponatremia.  My repeat blood pressure today in the office was 122/74.
